# Patient Record
Sex: MALE | Race: WHITE | Employment: OTHER | URBAN - METROPOLITAN AREA
[De-identification: names, ages, dates, MRNs, and addresses within clinical notes are randomized per-mention and may not be internally consistent; named-entity substitution may affect disease eponyms.]

---

## 2020-06-16 ENCOUNTER — TELEPHONE (OUTPATIENT)
Dept: NEUROLOGY | Age: 65
End: 2020-06-16

## 2020-06-16 NOTE — TELEPHONE ENCOUNTER
----- Message from Karen Maldonado sent at 6/16/2020  4:13 PM EDT -----  Regarding: Dr Sera Blanchard first and last name:      Reason for call: Pt is requesting to be scheduled for a new patient appt to be seen for (L) Face pain, in temple and jaw. Pt is being referred by NILS Villanueva at [facility].       Callback required yes/no and why: yes      Best contact number(s):[phone]      Details to clarify the request:      Karen Maldonado

## 2020-06-17 ENCOUNTER — OFFICE VISIT (OUTPATIENT)
Dept: NEUROLOGY | Age: 65
End: 2020-06-17

## 2020-06-17 VITALS
BODY MASS INDEX: 26.17 KG/M2 | OXYGEN SATURATION: 97 % | TEMPERATURE: 97.2 F | HEART RATE: 86 BPM | HEIGHT: 75 IN | RESPIRATION RATE: 18 BRPM | WEIGHT: 210.5 LBS | DIASTOLIC BLOOD PRESSURE: 70 MMHG | SYSTOLIC BLOOD PRESSURE: 122 MMHG

## 2020-06-17 DIAGNOSIS — G50.1 ATYPICAL FACE PAIN: Primary | ICD-10-CM

## 2020-06-17 RX ORDER — ACETAMINOPHEN 500 MG
TABLET ORAL
COMMUNITY

## 2020-06-17 RX ORDER — AA/PROT/LYSINE/METHIO/VIT C/B6 50-12.5 MG
TABLET ORAL
COMMUNITY

## 2020-06-17 RX ORDER — CHOLECALCIFEROL (VITAMIN D3) 50 MCG
CAPSULE ORAL
COMMUNITY

## 2020-06-17 RX ORDER — TESTOSTERONE CYPIONATE 100 MG/ML
INJECTION, SOLUTION INTRAMUSCULAR ONCE
COMMUNITY

## 2020-06-17 RX ORDER — BISMUTH SUBSALICYLATE 262 MG
1 TABLET,CHEWABLE ORAL DAILY
COMMUNITY

## 2020-06-17 RX ORDER — SUMATRIPTAN 100 MG/1
100 TABLET, FILM COATED ORAL
COMMUNITY

## 2020-06-17 RX ORDER — MELATONIN
DAILY
COMMUNITY

## 2020-06-17 RX ORDER — OMEGA-3 FATTY ACIDS 1000 MG
CAPSULE ORAL
COMMUNITY

## 2020-06-17 NOTE — LETTER
6/17/20 Patient: Jairo Marshall YOB: 1955 Date of Visit: 6/17/2020 Marisol Winter, [address] [address] VIA Facsimile: [phone] Dear Marisol Winter DO, Thank you for referring Mr. Melvin Winter to [facility] for evaluation. My notes for this consultation are attached. If you have questions, please do not hesitate to call me. I look forward to following your patient along with you.  
 
 
Sincerely, 
 
Mary Cardona MD

## 2020-06-17 NOTE — PROGRESS NOTES
Atypical left pes neurology Consult      Subjective:      Darnell Sandhu is a 60 y.o. male Who comes in today with a very interesting left facial pain history. He says his story began about 7 or 8 years ago and had a left V2 or maxillary division pain issue. He says it has characteristics of sharp stabbing and continuous discomfort and fairly intense. 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 Had successful coiling with neuro intervention. However on the original issue ENT would do some examination of the sinuses and found out that what sounds like the ostium of his left maxillary sinus would be near closed and they would open it up and on one occasion pain-free for 2 years. Then the symptoms returned and the ostium was near close to get and with reopening he got 4 years of pain relief until about 1-1/2 weeks ago. Recent visit to ENT and they said everything was clear and the ostia was opened. Interestingly he was given Imitrex by the ENT yesterday and last night it worked on his pain? He has no trigger points on his face and is never had any pain attribute in his mouth bit tongue teeth gums cheek etc.  He knows that his aneurysm coil is MRI friendly. He does have a history of shingles but it pertained to his abdominal cavity and not the face. Says hearing smell taste and vision is solid and otherwise uninvolved. Current Outpatient Medications   Medication Sig Dispense Refill    diphenhydramine HCl (BENADRYL PO) Take  by mouth as needed.  testosterone cypionate (DEPO-TESTOSTERONE) 100 mg/mL injection by IntraMUSCular route once.  multivitamin (ONE A DAY) tablet Take 1 Tab by mouth daily.  omega 3-dha-epa-fish oil (Fish Oil) 100-160-1,000 mg cap Take  by mouth.  cholecalciferol (VITAMIN D3) (1000 Units /25 mcg) tablet Take  by mouth daily.       omega-3 fatty acids (Super Omega-3) 1,000 mg cap Take  by mouth.  coenzyme q10 (Co Q-10) 10 mg cap Take  by mouth.  methylsulfonylmethane (MSM PO) Take  by mouth.  acetaminophen (Tylenol Extra Strength) 500 mg tablet Take  by mouth every six (6) hours as needed for Pain.  SUMAtriptan (Imitrex) 100 mg tablet Take 100 mg by mouth once as needed for Migraine.  OTHER Take 100 mcg by mouth. Colostrinin (12 of 36 days)      OTHER Take 2 Tabs by mouth daily. Arthromax      OTHER Take 500 mg by mouth daily. Panothetic Acid   (B-5)      OTHER Take 10 mg by mouth daily. Allertec      OTHER Take 3 Caps by mouth daily. ProstaGenix      OTHER Take 1 Tab by mouth daily. NAD+Cell Rejuvenator      OTHER Take 300 mcg by mouth daily. Lithium      OTHER Take 4 Caps by mouth daily. Vision Essentials      fluticasone (FLONASE) 50 mcg/actuation nasal spray instill 2 sprays into each nostril once daily 16 g 11    VIAGRA 100 mg tablet take 1 tablet by mouth if needed 6 Tab 11    naproxen (NAPROSYN) 250 mg tablet Take  by mouth two (2) times daily (with meals).  OMEPRAZOLE (PRILOSEC PO) Take  by mouth.         No Known Allergies  Past Medical History:   Diagnosis Date    Deviated septum     Headache     Hypogonadism 6/25/2010    Shingles     mid-section    Shoulder dislocation 6/25/2010    Tinnitus       Past Surgical History:   Procedure Laterality Date    HX ACL RECONSTRUCTION      HX COLONOSCOPY      HX HEENT      HX MENISCUS REPAIR      HX REFRACTIVE SURGERY  2000    HX SEPTOPLASTY      VASCULAR SURGERY PROCEDURE UNLIST  2006    Vertebral Artery Coiling      Social History     Socioeconomic History    Marital status:      Spouse name: Not on file    Number of children: Not on file    Years of education: Not on file    Highest education level: Not on file   Occupational History    Not on file   Social Needs    Financial resource strain: Not on file    Food insecurity     Worry: Not on file     Inability: Not on file    Transportation needs     Medical: Not on file     Non-medical: Not on file   Tobacco Use    Smoking status: Never Smoker    Smokeless tobacco: Current User    Tobacco comment: .5 can/day   Substance and Sexual Activity    Alcohol use: Yes     Comment: 1-2 daily    Drug use: No     Comment: 1 can every 2 days    Sexual activity: Yes     Partners: Female     Birth control/protection: None   Lifestyle    Physical activity     Days per week: Not on file     Minutes per session: Not on file    Stress: Not on file   Relationships    Social connections     Talks on phone: Not on file     Gets together: Not on file     Attends Druze service: Not on file     Active member of club or organization: Not on file     Attends meetings of clubs or organizations: Not on file     Relationship status: Not on file    Intimate partner violence     Fear of current or ex partner: Not on file     Emotionally abused: Not on file     Physically abused: Not on file     Forced sexual activity: Not on file   Other Topics Concern    Not on file   Social History Narrative    Not on file      Family History   Problem Relation Age of Onset    Cancer Mother         breast cancer    Cancer Father         lung csncer      Visit Vitals  /70   Pulse 86   Temp 97.2 °F (36.2 °C)   Resp 18   Ht 6' 2.5\" (1.892 m)   Wt 95.5 kg (210 lb 8 oz)   SpO2 97%   BMI 26.67 kg/m²        Review of Systems:   A comprehensive review of systems was negative except for that written in the HPI. Neuro Exam:     Appearance: The patient is well developed, well nourished, provides a coherent history and is in no acute distress. Mental Status: Oriented to time, place and person. Mood and affect appropriate. Cranial Nerves:   Intact visual fields. Fundi are benign. DIANN, EOM's full, no nystagmus, no ptosis. Facial sensation is normal. Corneal reflexes are intact. Facial movement is symmetric. Hearing is normal bilaterally.  Palate is midline with normal sternocleidomastoid and trapezius muscles are normal. Tongue is midline. No synkinetic movements or capture, with left facial muscle movements. Motor:  5/5 strength in upper and lower proximal and distal muscles. Normal bulk and tone. No fasciculations. Reflexes:   Deep tendon reflexes 2+/4 and symmetrical.   Sensory:   Normal to touch, pinprick and vibration. Gait:  Normal gait. Tremor:   No tremor noted. Cerebellar:  No cerebellar signs present. Neurovascular:  Normal heart sounds and regular rhythm, peripheral pulses intact, and no carotid bruits. ENT exam unrevealing. Assessment:   Atypical left face pain history. Definitely not your run-of-the-mill trigeminal neuralgia story. Currently has a normal exam and suppresses pain with simple over-the-counter extra strength Tylenol. I have nothing that matches that performance great. Did talk very quickly about the merits of doing updated MRI imaging of the brain. He will watch his symptom complex and if there is any trending he knows where to find me. If I do not hear from him I will assume he is doing well. We did talk about the careful means of angiographic analysis at [facility] prior to the coiling of the aneurysm, and I am sure they would have made a new discovery of any aberrant blood vessel making contact with his maxillary division of the L trigeminal nerve or anything similar. Plan:   Revisit as needed.   Signed by :  Michela Schulz MD

## 2020-06-17 NOTE — PATIENT INSTRUCTIONS
Patient history reviewed patient examined. A very atypical face pain story to be sure. Patient will watch events as they are and we talked briefly about updated reimaging of the brain with MRI as I understand the aneurysm coil is MRI friendly. Currently managing pain with simple extra strength Tylenol and while I do have other choices I have nothing as simple orders cheap is that remedy. We will leave the revisit open-ended as it is and good luck.

## 2020-07-07 ENCOUNTER — OFFICE VISIT (OUTPATIENT)
Dept: NEUROLOGY | Age: 65
End: 2020-07-07

## 2020-07-07 VITALS
HEART RATE: 94 BPM | BODY MASS INDEX: 27.72 KG/M2 | HEIGHT: 74 IN | DIASTOLIC BLOOD PRESSURE: 64 MMHG | SYSTOLIC BLOOD PRESSURE: 128 MMHG | RESPIRATION RATE: 16 BRPM | OXYGEN SATURATION: 97 % | TEMPERATURE: 96.8 F | WEIGHT: 216 LBS

## 2020-07-07 DIAGNOSIS — G50.1 ATYPICAL FACIAL PAIN: Primary | ICD-10-CM

## 2020-07-07 RX ORDER — CARBAMAZEPINE 100 MG/1
TABLET, CHEWABLE ORAL
Qty: 90 TAB | Refills: 5 | Status: SHIPPED | OUTPATIENT
Start: 2020-07-07

## 2020-07-07 RX ORDER — GABAPENTIN 300 MG/1
CAPSULE ORAL
Qty: 30 CAP | Refills: 0 | Status: SHIPPED | OUTPATIENT
Start: 2020-07-07

## 2020-07-07 NOTE — PATIENT INSTRUCTIONS
Patient history reviewed patient examined. Current plans include getting updated imaging of the head with an MRI and MRA. Patient will search for the [facility] approval of MRI compatibility on his coiled aneurysm. Will dispense the drug Tegretol as needed for pain relief and give him a 30-day prescription of gabapentin 300 mg. This will lead up to potential referral to Dr. Jj Arndt the neurosurgeon and possible consideration of gamma knife surgery.

## 2020-07-07 NOTE — LETTER
7/7/20 Patient: Gary Hendrix YOB: 1955 Date of Visit: 7/7/2020 Ana Hillman, [address] [facility] [id number] VIA Facsimile: [phone] Dear Ana Hillman DO, Thank you for referring Mr. Bere Giles to [facility] for evaluation. My notes for this consultation are attached. If you have questions, please do not hesitate to call me. I look forward to following your patient along with you.  
 
 
Sincerely, 
 
Barrett Camargo MD

## 2020-07-07 NOTE — PROGRESS NOTES
Neurology Consult      Subjective:      Lisa Manrique is a 60 y.o. male who comes in today as a second visit with previously established left V2 atypical facial pain history. Said in the meantime he became symptomatic again and once again describes it as a very intense suicidal type of stabbing pain and it is situated in the left nasolabial fold area and can extend back to the left corner of the eye and left preauricular area. Sometimes feels as if he has enhanced moisture in his mouth. Can occur anytime of the day or night but sometimes his Magic hour is closer in the evening. Today clarified if there was any autonomic symptoms affiliated with the pain as an trigeminal autonomic cephalgia but really not as a goes to perspiration tearing of the eye nose running injection of the eye ptosis among other possibilities. Will prescribe Tegretol 100 mg chewables 3 times a day as needed as a relief and also gabapentin 300 mg 1 a day if needed a month supply. Someone suggested possible referral to Dr. Juan Jose Rider the neurosurgeon and regarding gamma knife. Provided the information from [facility] can be found and endorsed, we will go ahead and order the MRI and MRA of the brain and hope that that can be accomplished sooner rather than later. Revisit in about 6 weeks. Currently has no trigger on the face or in the mouth etc. Someone suggested shingles as a culprit, and he has had abdominal shingles before. I suggest he follow up with primary care and look into the Shingrix vaccine. Current Outpatient Medications   Medication Sig Dispense Refill    carBAMazepine (TEGretol) 100 mg chewable tablet 1 po tid prn  Indications: facial nerve pain 90 Tab 5    gabapentin (NEURONTIN) 300 mg capsule 1 po every day prn  Indications: neuropathic pain 30 Cap 0    diphenhydramine HCl (BENADRYL PO) Take  by mouth as needed.  testosterone cypionate (DEPO-TESTOSTERONE) 100 mg/mL injection by IntraMUSCular route once.       multivitamin (ONE A DAY) tablet Take 1 Tab by mouth daily.  cholecalciferol (VITAMIN D3) (1000 Units /25 mcg) tablet Take  by mouth daily.  coenzyme q10 (Co Q-10) 10 mg cap Take  by mouth.  methylsulfonylmethane (MSM PO) Take  by mouth.  acetaminophen (Tylenol Extra Strength) 500 mg tablet Take  by mouth every six (6) hours as needed for Pain.  OTHER Take 10 mg by mouth daily. Allertec      OTHER Take 3 Caps by mouth daily. ProstaGenix      OTHER Take 1 Tab by mouth daily. NAD+Cell Rejuvenator      OTHER Take 300 mcg by mouth daily. Lithium      fluticasone (FLONASE) 50 mcg/actuation nasal spray instill 2 sprays into each nostril once daily 16 g 11    VIAGRA 100 mg tablet take 1 tablet by mouth if needed 6 Tab 11    naproxen (NAPROSYN) 250 mg tablet Take  by mouth two (2) times daily (with meals).  omega 3-dha-epa-fish oil (Fish Oil) 100-160-1,000 mg cap Take  by mouth.  omega-3 fatty acids (Super Omega-3) 1,000 mg cap Take  by mouth.  SUMAtriptan (Imitrex) 100 mg tablet Take 100 mg by mouth once as needed for Migraine.  OTHER Take 100 mcg by mouth. Colostrinin (12 of 36 days)      OTHER Take 2 Tabs by mouth daily. Arthromax      OTHER Take 500 mg by mouth daily. Panothetic Acid   (B-5)      OTHER Take 4 Caps by mouth daily. Vision Essentials      OMEPRAZOLE (PRILOSEC PO) Take  by mouth.         No Known Allergies  Past Medical History:   Diagnosis Date    Deviated septum     Headache     Hypogonadism 6/25/2010    Shingles     mid-section    Shoulder dislocation 6/25/2010    Tinnitus       Past Surgical History:   Procedure Laterality Date    HX ACL RECONSTRUCTION      HX COLONOSCOPY      HX HEENT      HX MENISCUS REPAIR      HX REFRACTIVE SURGERY  2000    HX SEPTOPLASTY      VASCULAR SURGERY PROCEDURE UNLIST  2006    Vertebral Artery Coiling      Social History     Socioeconomic History    Marital status:      Spouse name: Not on file    Number of children: Not on file    Years of education: Not on file    Highest education level: Not on file   Occupational History    Not on file   Social Needs    Financial resource strain: Not on file    Food insecurity     Worry: Not on file     Inability: Not on file    Transportation needs     Medical: Not on file     Non-medical: Not on file   Tobacco Use    Smoking status: Never Smoker    Smokeless tobacco: Current User    Tobacco comment: .5 can/day   Substance and Sexual Activity    Alcohol use: Yes     Comment: 1-2 daily    Drug use: No     Comment: 1 can every 2 days    Sexual activity: Yes     Partners: Female     Birth control/protection: None   Lifestyle    Physical activity     Days per week: Not on file     Minutes per session: Not on file    Stress: Not on file   Relationships    Social connections     Talks on phone: Not on file     Gets together: Not on file     Attends Holiness service: Not on file     Active member of club or organization: Not on file     Attends meetings of clubs or organizations: Not on file     Relationship status: Not on file    Intimate partner violence     Fear of current or ex partner: Not on file     Emotionally abused: Not on file     Physically abused: Not on file     Forced sexual activity: Not on file   Other Topics Concern    Not on file   Social History Narrative    Not on file      Family History   Problem Relation Age of Onset    Cancer Mother         breast cancer    Cancer Father         lung csncer      Visit Vitals  /64 (BP 1 Location: Left arm, BP Patient Position: Sitting)   Pulse 94   Temp 96.8 °F (36 °C)   Resp 16   Ht 6' 2\" (1.88 m)   Wt 98 kg (216 lb)   SpO2 97%   BMI 27.73 kg/m²        Review of Systems:   A comprehensive review of systems was negative except for that written in the HPI. Neuro Exam:     Appearance: The patient is well developed, well nourished, provides a coherent history and is in no acute distress. Mental Status: Oriented to time, place and person. Mood and affect appropriate. Cranial Nerves:   Intact visual fields. Fundi are benign. DIANN, EOM's full, no nystagmus, no ptosis. Facial sensation is normal. Corneal reflexes are intact. Facial movement is symmetric. Hearing is normal bilaterally. Palate is midline with normal sternocleidomastoid and trapezius muscles are normal. Tongue is midline. Motor:  5/5 strength in upper and lower proximal and distal muscles. Normal bulk and tone. No fasciculations. Reflexes:   Deep tendon reflexes 2+/4 and symmetrical.   Sensory:   Normal to touch, pinprick and vibration. Gait:  Normal gait. Tremor:   No tremor noted. Cerebellar:  No cerebellar signs present. Neurovascular:  Normal heart sounds and regular rhythm, peripheral pulses intact, and no carotid bruits. Assessment:   Atypical left V2 facial pain. See follow-up information and dictation above. Is back in a symptomatic mode and needs help. We will go ahead and get updated imaging of his head that will include brain MRI and MRA. He says he has information from [facility] that declares that his coiled aneurysm is MRI compatible. He is going to go and get that and I will go ahead and make the orders for the testing just reference. Will prescribe Tegretol 100 mg 3 times daily as needed and a 30-day supply of gabapentin 300 mg 1/day as needed. We will follow-up in 6 weeks and may end up making a referral to Dr. Roby Thomas on possible gamma knife intervention. Plan:   Revisit 6 weeks.   Signed by :  Sondra Yee MD

## 2020-07-16 ENCOUNTER — HOSPITAL ENCOUNTER (OUTPATIENT)
Dept: MRI IMAGING | Age: 65
Discharge: HOME | End: 2020-07-16
Attending: SPECIALIST
Payer: COMMERCIAL

## 2020-07-16 DIAGNOSIS — G50.1 ATYPICAL FACIAL PAIN: ICD-10-CM

## 2020-07-16 PROCEDURE — 70551 MRI BRAIN STEM W/O DYE: CPT

## 2020-07-16 PROCEDURE — 70544 MR ANGIOGRAPHY HEAD W/O DYE: CPT

## 2020-07-27 ENCOUNTER — OFFICE VISIT (OUTPATIENT)
Dept: NEUROLOGY | Age: 65
End: 2020-07-27

## 2020-07-27 VITALS
TEMPERATURE: 97.8 F | SYSTOLIC BLOOD PRESSURE: 124 MMHG | WEIGHT: 216 LBS | BODY MASS INDEX: 27.73 KG/M2 | DIASTOLIC BLOOD PRESSURE: 78 MMHG | RESPIRATION RATE: 17 BRPM | HEART RATE: 106 BPM | OXYGEN SATURATION: 96 %

## 2020-07-27 DIAGNOSIS — G50.1 ATYPICAL FACE PAIN: Primary | ICD-10-CM

## 2020-07-27 NOTE — PATIENT INSTRUCTIONS
Patient history reviewed patient examined. Patient doing reasonably well with simple Tegretol 100 mg as needed. I am hoping this is a very self-limited timeframe and that he will be free from the constraints of left facial pain from this point on. Good luck and he certainly welcome to  a free copy of the CD as it goes to both the brain MRI and MRA downstairs.

## 2020-07-27 NOTE — PROGRESS NOTES
Neurology Consult      Subjective:      Noni Lopez is a 60 y.o. male who comes in today with previously described atypical left V2 facial pain. The outpatient brain MRI and MRA were normal with note of coiling of right distal vertebral artery. Went through 3-day periods of separation between left face pain and  Apparently the Tegretol 100 mg chewables worked and sometimes in combination with gabapentin 300 mg. For future reference is certainly welcome to try the Tegretol 200 mg and I have gone higher to achieve nerve pain control with this product as well even 300 mg or higher. Hopefully he will have a prolonged period of pain relief. 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 I do not think this is the basis of his pain as he does not have any confident or florid autonomic phenomena in the course of his pain cycle. He is certainly welcome to  a free CD copy of the brain MRI and MRA down in x-ray and gave him a copy of the radiology report that went with both tests. Even as recent as this face-to-face encounter he does not have a solid trigger on the face or in the mouth and his pain is a slow building discomfort instead of an acute broadcast of neuropathic pain. We will have his appointment go as a see as needed and if I do not hear from him I will have to assume he is doing well. Good luck. He did tell me he got his recent Shingrix vaccination. Current Outpatient Medications   Medication Sig Dispense Refill    carBAMazepine (TEGretol) 100 mg chewable tablet 1 po tid prn  Indications: facial nerve pain 90 Tab 5    gabapentin (NEURONTIN) 300 mg capsule 1 po every day prn  Indications: neuropathic pain 30 Cap 0    diphenhydramine HCl (BENADRYL PO) Take  by mouth as needed.  testosterone cypionate (DEPO-TESTOSTERONE) 100 mg/mL injection by IntraMUSCular route once.       multivitamin (ONE A DAY) tablet Take 1 Tab by mouth daily.  omega 3-dha-epa-fish oil (Fish Oil) 100-160-1,000 mg cap Take  by mouth.  cholecalciferol (VITAMIN D3) (1000 Units /25 mcg) tablet Take  by mouth daily.  omega-3 fatty acids (Super Omega-3) 1,000 mg cap Take  by mouth.  coenzyme q10 (Co Q-10) 10 mg cap Take  by mouth.  methylsulfonylmethane (MSM PO) Take  by mouth.  acetaminophen (Tylenol Extra Strength) 500 mg tablet Take  by mouth every six (6) hours as needed for Pain.  SUMAtriptan (Imitrex) 100 mg tablet Take 100 mg by mouth once as needed for Migraine.  OTHER Take 100 mcg by mouth. Colostrinin (12 of 36 days)      OTHER Take 2 Tabs by mouth daily. Arthromax      OTHER Take 500 mg by mouth daily. Panothetic Acid   (B-5)      OTHER Take 10 mg by mouth daily. Allertec      OTHER Take 3 Caps by mouth daily. ProstaGenix      OTHER Take 1 Tab by mouth daily. NAD+Cell Rejuvenator      OTHER Take 300 mcg by mouth daily. Lithium      OTHER Take 4 Caps by mouth daily. Vision Essentials      fluticasone (FLONASE) 50 mcg/actuation nasal spray instill 2 sprays into each nostril once daily 16 g 11    VIAGRA 100 mg tablet take 1 tablet by mouth if needed 6 Tab 11    naproxen (NAPROSYN) 250 mg tablet Take  by mouth two (2) times daily (with meals).  OMEPRAZOLE (PRILOSEC PO) Take  by mouth.         No Known Allergies  Past Medical History:   Diagnosis Date    Deviated septum     Headache     Hypogonadism 6/25/2010    Shingles     mid-section    Shoulder dislocation 6/25/2010    Tinnitus       Past Surgical History:   Procedure Laterality Date    HX ACL RECONSTRUCTION      HX COLONOSCOPY      HX HEENT      HX MENISCUS REPAIR      HX REFRACTIVE SURGERY  2000    HX SEPTOPLASTY      VASCULAR SURGERY PROCEDURE UNLIST  2006    Vertebral Artery Coiling      Social History     Socioeconomic History    Marital status:      Spouse name: Not on file    Number of children: Not on file    Years of education: Not on file    Highest education level: Not on file   Occupational History    Not on file   Social Needs    Financial resource strain: Not on file    Food insecurity     Worry: Not on file     Inability: Not on file    Transportation needs     Medical: Not on file     Non-medical: Not on file   Tobacco Use    Smoking status: Never Smoker    Smokeless tobacco: Current User    Tobacco comment: .5 can/day   Substance and Sexual Activity    Alcohol use: Yes     Comment: 1-2 daily    Drug use: No     Comment: 1 can every 2 days    Sexual activity: Yes     Partners: Female     Birth control/protection: None   Lifestyle    Physical activity     Days per week: Not on file     Minutes per session: Not on file    Stress: Not on file   Relationships    Social connections     Talks on phone: Not on file     Gets together: Not on file     Attends Christianity service: Not on file     Active member of club or organization: Not on file     Attends meetings of clubs or organizations: Not on file     Relationship status: Not on file    Intimate partner violence     Fear of current or ex partner: Not on file     Emotionally abused: Not on file     Physically abused: Not on file     Forced sexual activity: Not on file   Other Topics Concern    Not on file   Social History Narrative    Not on file      Family History   Problem Relation Age of Onset    Cancer Mother         breast cancer    Cancer Father         lung csncer      Visit Vitals  /78   Pulse (!) 106   Temp 97.8 °F (36.6 °C)   Resp 17   Wt 98 kg (216 lb)   SpO2 96%   BMI 27.73 kg/m²        Review of Systems:   A comprehensive review of systems was negative except for that written in the HPI. Neuro Exam:     Appearance: The patient is well developed, well nourished, provides a coherent history and is in no acute distress. Mental Status: Oriented to time, place and person.  Mood and affect appropriate. Cranial Nerves:   Intact visual fields. Fundi are benign. DIANN, EOM's full, no nystagmus, no ptosis. Facial sensation is normal. Corneal reflexes are intact. Facial movement is symmetric. Hearing is normal bilaterally. Palate is midline with normal sternocleidomastoid and trapezius muscles are normal. Tongue is midline. Motor:  5/5 strength in upper and lower proximal and distal muscles. Normal bulk and tone. No fasciculations. Reflexes:   Deep tendon reflexes 2+/4 and symmetrical.   Sensory:   Normal to touch, pinprick and vibration. Gait:  Normal gait. Tremor:   No tremor noted. Cerebellar:  No cerebellar signs present. Neurovascular:  Normal heart sounds and regular rhythm, peripheral pulses intact, and no carotid bruits. Assessment:   Atypical left V2 facial pain. Will use the Tegretol as needed and hopefully he will have a long period of pain relief from this point onward. Is welcome back here as needed and certainly welcome to  a free CD copy in radiology for the brain MRI and MRA. Plan:   Revisit as needed.   Signed by :  Juan Francisco Guzman MD

## 2020-07-27 NOTE — LETTER
7/27/20 Patient: Kyle Pat YOB: 1955 Date of Visit: 7/27/2020 Kayli Marx, [address] [place] [id number] VIA Facsimile: [phone] Dear Kayli Marx DO, Thank you for referring Mr. Amanda Newsome to [facility] for evaluation. My notes for this consultation are attached. If you have questions, please do not hesitate to call me. I look forward to following your patient along with you.  
 
 
Sincerely, 
 
Jc Maddox MD

## 2021-07-09 ENCOUNTER — HOSPITAL ENCOUNTER (OUTPATIENT)
Dept: LAB | Age: 66
Discharge: HOME | End: 2021-07-09

## 2021-12-07 ENCOUNTER — HOSPITAL ENCOUNTER (OUTPATIENT)
Dept: LAB | Age: 66
Discharge: HOME | End: 2021-12-07

## 2023-05-11 RX ORDER — NAPROXEN 250 MG/1
TABLET ORAL 2 TIMES DAILY WITH MEALS
COMMUNITY

## 2023-05-11 RX ORDER — TESTOSTERONE CYPIONATE 100 MG/ML
INJECTION, SOLUTION INTRAMUSCULAR ONCE
COMMUNITY

## 2023-05-11 RX ORDER — SILDENAFIL 100 MG/1
TABLET, FILM COATED ORAL
COMMUNITY
Start: 2012-02-14

## 2023-05-11 RX ORDER — ACETAMINOPHEN 500 MG
TABLET ORAL EVERY 6 HOURS PRN
COMMUNITY

## 2023-05-11 RX ORDER — FLUTICASONE PROPIONATE 50 MCG
2 SPRAY, SUSPENSION (ML) NASAL DAILY
COMMUNITY
Start: 2015-05-13

## 2023-05-11 RX ORDER — CARBAMAZEPINE 100 MG/1
TABLET, CHEWABLE ORAL
COMMUNITY
Start: 2020-07-07

## 2023-05-11 RX ORDER — SUMATRIPTAN 100 MG/1
TABLET, FILM COATED ORAL
COMMUNITY

## 2023-05-11 RX ORDER — GABAPENTIN 300 MG/1
CAPSULE ORAL
COMMUNITY
Start: 2020-07-07

## 2024-01-23 ENCOUNTER — ANESTHESIA (OUTPATIENT)
Facility: HOSPITAL | Age: 69
End: 2024-01-23
Payer: COMMERCIAL

## 2024-01-23 ENCOUNTER — ANESTHESIA EVENT (OUTPATIENT)
Facility: HOSPITAL | Age: 69
End: 2024-01-23
Payer: COMMERCIAL

## 2024-01-23 ENCOUNTER — HOSPITAL ENCOUNTER (OUTPATIENT)
Facility: HOSPITAL | Age: 69
Setting detail: OUTPATIENT SURGERY
Discharge: HOME | End: 2024-01-23
Attending: SPECIALIST | Admitting: SPECIALIST
Payer: COMMERCIAL

## 2024-01-23 VITALS
OXYGEN SATURATION: 94 % | WEIGHT: 212.3 LBS | TEMPERATURE: 98.1 F | BODY MASS INDEX: 26.4 KG/M2 | RESPIRATION RATE: 17 BRPM | DIASTOLIC BLOOD PRESSURE: 72 MMHG | HEIGHT: 75 IN | HEART RATE: 60 BPM | SYSTOLIC BLOOD PRESSURE: 109 MMHG

## 2024-01-23 PROCEDURE — 7100000010 HC PHASE II RECOVERY - FIRST 15 MIN: Performed by: SPECIALIST

## 2024-01-23 PROCEDURE — 2709999900 HC NON-CHARGEABLE SUPPLY: Performed by: SPECIALIST

## 2024-01-23 PROCEDURE — 2500000003 HC RX 250 WO HCPCS: Performed by: NURSE ANESTHETIST, CERTIFIED REGISTERED

## 2024-01-23 PROCEDURE — 6360000002 HC RX W HCPCS: Performed by: NURSE ANESTHETIST, CERTIFIED REGISTERED

## 2024-01-23 PROCEDURE — 88305 TISSUE EXAM BY PATHOLOGIST: CPT

## 2024-01-23 PROCEDURE — 3600007512: Performed by: SPECIALIST

## 2024-01-23 PROCEDURE — 3600007502: Performed by: SPECIALIST

## 2024-01-23 PROCEDURE — 3700000001 HC ADD 15 MINUTES (ANESTHESIA): Performed by: SPECIALIST

## 2024-01-23 PROCEDURE — 2580000003 HC RX 258: Performed by: SPECIALIST

## 2024-01-23 PROCEDURE — 3700000000 HC ANESTHESIA ATTENDED CARE: Performed by: SPECIALIST

## 2024-01-23 PROCEDURE — 7100000011 HC PHASE II RECOVERY - ADDTL 15 MIN: Performed by: SPECIALIST

## 2024-01-23 RX ORDER — SODIUM CHLORIDE 0.9 % (FLUSH) 0.9 %
5-40 SYRINGE (ML) INJECTION PRN
Status: DISCONTINUED | OUTPATIENT
Start: 2024-01-23 | End: 2024-01-23 | Stop reason: HOSPADM

## 2024-01-23 RX ORDER — SODIUM CHLORIDE 9 MG/ML
INJECTION, SOLUTION INTRAVENOUS CONTINUOUS
Status: DISCONTINUED | OUTPATIENT
Start: 2024-01-23 | End: 2024-01-23 | Stop reason: HOSPADM

## 2024-01-23 RX ORDER — PROPOFOL 10 MG/ML
INJECTION, EMULSION INTRAVENOUS CONTINUOUS PRN
Status: DISCONTINUED | OUTPATIENT
Start: 2024-01-23 | End: 2024-01-23 | Stop reason: SDUPTHER

## 2024-01-23 RX ORDER — DEXMEDETOMIDINE HYDROCHLORIDE 100 UG/ML
INJECTION, SOLUTION INTRAVENOUS PRN
Status: DISCONTINUED | OUTPATIENT
Start: 2024-01-23 | End: 2024-01-23 | Stop reason: SDUPTHER

## 2024-01-23 RX ORDER — LITHIUM CARBONATE 300 MG/1
300 CAPSULE ORAL
COMMUNITY

## 2024-01-23 RX ADMIN — DEXMEDETOMIDINE 4 MCG: 100 INJECTION, SOLUTION INTRAVENOUS at 10:04

## 2024-01-23 RX ADMIN — PROPOFOL 30 MG: 10 INJECTION, EMULSION INTRAVENOUS at 10:07

## 2024-01-23 RX ADMIN — PROPOFOL 140 MCG/KG/MIN: 10 INJECTION, EMULSION INTRAVENOUS at 10:04

## 2024-01-23 RX ADMIN — SODIUM CHLORIDE: 9 INJECTION, SOLUTION INTRAVENOUS at 09:59

## 2024-01-23 RX ADMIN — PROPOFOL 70 MG: 10 INJECTION, EMULSION INTRAVENOUS at 10:06

## 2024-01-23 ASSESSMENT — PAIN - FUNCTIONAL ASSESSMENT: PAIN_FUNCTIONAL_ASSESSMENT: 0-10

## 2024-01-23 NOTE — PROGRESS NOTES
Jd Fisher  1955  [id number]    Situation:  Verbal report received from: Steff ANGULO  Procedure: Procedure(s):  COLONOSCOPY  COLONOSCOPY POLYPECTOMY REMOVAL SNARE/STOMA    Background:    Preoperative diagnosis: Personal history of colonic polyps [Z86.010]  Postoperative diagnosis: * No post-op diagnosis entered *    :  Dr. George  Assistant(s): Circulator: Steff Crowell RN  Endoscopy Technician: Loyd Roth    Specimens:   ID Type Source Tests Collected by Time Destination   1 : polyps Tissue Colon-Ascending SURGICAL PATHOLOGY Nate George MD 1/23/2024 1017    2 : polyp Tissue Sigmoid Colon SURGICAL PATHOLOGY Nate George MD 1/23/2024 1021      H. Pylori  No    Assessment:  Intra-procedure medications    Anesthesia gave intra-procedure sedation and medications, see anesthesia flow sheet Yes    Intravenous fluids: NS@ KVO     Vital signs stable yes    Abdominal assessment: round and soft  yes    Recommendation:  Discharge patient per MD order yes.  Family or Friend wife  Permission to share finding with family or friend Yes

## 2024-01-23 NOTE — PROGRESS NOTES
Endoscopy discharge instructions have been reviewed and given to patient.  The patient verbalized understanding and acceptance of instructions.      Dr. George discussed with spouse procedure findings and next steps.

## 2024-01-23 NOTE — ANESTHESIA PRE PROCEDURE
Department of Anesthesiology  Preprocedure Note       Name:  Jd Fisher   Age:  69 y.o.  :  1955                                          MRN:  [id number]         Date:  2024      Surgeon: Surgeon(s):  Nate George MD    Procedure: Procedure(s):  COLONOSCOPY    Medications prior to admission:   Prior to Admission medications    Medication Sig Start Date End Date Taking? Authorizing Provider   lithium 300 MG capsule Take 1 capsule by mouth 3 times daily (with meals)   Yes Provider, MD Aurelia   acetaminophen (TYLENOL) 500 MG tablet Take by mouth every 6 hours as needed    Automatic Reconciliation, Ar   carBAMazepine (TEGRETOL) 100 MG chewable tablet 1 po tid prn  Indications: facial nerve pain 20   Automatic Reconciliation, Ar   vitamin D (CHOLECALCIFEROL) 25 MCG (1000 UT) TABS tablet Take by mouth daily    Automatic Reconciliation, Ar   Coenzyme Q10 10 MG CAPS Take by mouth    Automatic Reconciliation, Ar   fluticasone (FLONASE) 50 MCG/ACT nasal spray 2 sprays by Each Nostril route daily 5/13/15   Automatic Reconciliation, Ar   gabapentin (NEURONTIN) 300 MG capsule 1 po every day prn  Indications: neuropathic pain 20   Automatic Reconciliation, Ar   naproxen (NAPROSYN) 250 MG tablet Take by mouth 2 times daily (with meals)    Automatic Reconciliation, Ar   sildenafil (VIAGRA) 100 MG tablet take 1 tablet by mouth if needed 12   Automatic Reconciliation, Ar   SUMAtriptan (IMITREX) 100 MG tablet Take by mouth once as needed    Automatic Reconciliation, Ar   testosterone cypionate (DEPOTESTOTERONE CYPIONATE) 100 MG/ML injection Inject into the muscle once.  Patient not taking: Reported on 2024    Automatic Reconciliation, Ar       Current medications:    Current Facility-Administered Medications   Medication Dose Route Frequency Provider Last Rate Last Admin   • 0.9 % sodium chloride infusion   IntraVENous Continuous Nate George MD       • sodium chloride flush

## 2024-01-23 NOTE — H&P
69 y.o. male for open access colonoscopy for screening   Additional data for completion of the targeted pre-endoscopy H&P will be provided under 'H&P interval notes'.  Please see that document which will be attached to this.  Nate George MD    Last colonoscopy [place] 2012 Tubular Adenoma

## 2024-01-23 NOTE — H&P
Pre-Endoscopy H&P Update  Chief complaint/HPI/ROS:  The indication for the procedure, the patient's history and the patient's current medications are reviewed prior to the procedure and that data is reported on the H&P to which this document is attached.  Any significant complaints with regard to organ systems will be noted, and if not mentioned then a review of systems is not contributory.  Past Medical History:   Diagnosis Date    Deviated septum     Headache     Shingles     mid-section    Shoulder dislocation 2010    Tinnitus       Past Surgical History:   Procedure Laterality Date    ANTERIOR CRUCIATE LIGAMENT REPAIR      COLONOSCOPY      [place]     HEENT      KNEE SURGERY      REFRACTIVE SURGERY      SEPTOPLASTY      VASCULAR SURGERY  2006    Vertebral Artery Coiling     Social   Social History     Tobacco Use    Smoking status: Never    Smokeless tobacco: Current    Tobacco comments:     Quit smoking: .5 can/day   Substance Use Topics    Alcohol use: Yes     Alcohol/week: 1.0 standard drink of alcohol     Types: 1 Glasses of wine per week     Comment: daily      Family History   Problem Relation Age of Onset    Cancer Father         lung csncer    Cancer Mother         breast cancer      No Known Allergies   Prior to Admission Medications   Prescriptions Last Dose Informant Patient Reported? Taking?   Coenzyme Q10 10 MG CAPS 2024  Yes No   Sig: Take by mouth   SUMAtriptan (IMITREX) 100 MG tablet   Yes No   Sig: Take by mouth once as needed   acetaminophen (TYLENOL) 500 MG tablet   Yes No   Sig: Take by mouth every 6 hours as needed   carBAMazepine (TEGRETOL) 100 MG chewable tablet   Yes No   Si po tid prn  Indications: facial nerve pain   fluticasone (FLONASE) 50 MCG/ACT nasal spray 2024  Yes No   Si sprays by Each Nostril route daily   gabapentin (NEURONTIN) 300 MG capsule   Yes No   Si po every day prn  Indications: neuropathic pain   lithium 300 MG capsule Past Week  Yes

## 2024-01-23 NOTE — DISCHARGE INSTRUCTIONS
[facility]  [facility]  Nate Ibrahim MD  [phone]      January 23, 2024    Jd Fisher  YOB: 1955    COLONOSCOPY DISCHARGE INSTRUCTIONS    If there is redness at IV site you should apply warm compress to area.  If redness or soreness persist contact Dr. Ibrahim' or your primary care doctor.    There may be a slight amount of blood passed from the rectum.  Gaseous discomfort may develop, but walking, belching will help relieve this.  You may not operate a vehicle for 12 hours  You may not operate machinery or dangerous appliances for rest of today  You may not drink alcoholic beverages for 12 hours  Avoid making any critical decisions for 24 hours    DIET:  You may resume your normal diet, but some patients find that heavy or large meals may lead to indigestion or vomiting.  I suggest a light meal as first food intake.    MEDICATIONS:  The use of some over-the-counter pain medication may lead to bleeding after colon biopsies or polyp removal.  Tylenol (also called acetaminophen) is safe to take even if you have had colonoscopy with polyp removal.  Based on the procedure you had today you may not safely take aspirin or aspirin-like products for the next ten (10) days.  Remember that Tylenol (also called acetaminophen) is safe to take after colonoscopy even if you have had biopsies or polyps removed.    ACTIVITY:  You may resume your normal household activities, but it is recommended that you spend the remainder of the day resting -  avoid any strenuous activity.    CALL DR. IBRAHIM' OFFICE IF:  Increasing pain, nausea, vomiting  Abdominal distension (swelling)  Significant new or increased bleeding (oral or rectal)  Fever/Chills  Chest pain/shortness of breath                       Additional instructions:   Great news, no colon cancer but we did find and remove three polyps and I'll contact you with those results in

## 2024-01-23 NOTE — OP NOTE
[facility]  [facility]  Nate George MD  [phone]      2024    Colonoscopy Procedure Note  Jd Fisher  :  1955  [place] Medical Record Number: [id number]    Indications:     Personal history of colon polyps (screening only)  PCP:  Jm Carrizales DO  Anesthesia/Sedation: Conscious Sedation/Moderate Sedation/GETA, see notes  Endoscopist:  Dr. Nate George  Complications:  None  Estimated Blood Loss:  None    Permit:  The indications, risks, benefits and alternatives were reviewed with the patient or their decision maker who was provided an opportunity to ask questions and all questions were answered.  The specific risks of colonoscopy with conscious sedation were reviewed, including but not limited to anesthetic complication, bleeding, adverse drug reaction, missed lesion, infection, IV site reactions, and intestinal perforation which would lead to the need for surgical repair.  Alternatives to colonoscopy including radiographic imaging, observation without testing, or laboratory testing were reviewed including the limitations of those alternatives.  After considering the options and having all their questions answered, the patient or their decision maker provided both verbal and written consent to proceed.        Procedure in Detail:  After obtaining informed consent, positioning of the patient in the left lateral decubitus position, and conduction of a pre-procedure pause or \"time out\" the endoscope was introduced into the anus and advanced to the cecum, which was identified by the ileocecal valve and appendiceal orifice.  The quality of the colonic preparation was good.  A careful inspection was made as the colonoscope was withdrawn, findings and interventions are described below.    Findings:   Ascending polyps 4mm and 6mm; sigmoid polyp 3mm.  All removed with cold snare, retrieved, and

## 2024-01-23 NOTE — PERIOP NOTE
Endoscope was pre-cleaned at bedside immediately following procedure by Michael. Assumed pt care taken to recovery via stretcher for further monitoring please see CRNA chart sedation/monitoring for procedures pt tolerated well.

## (undated) DEVICE — SNARE ENDOSCP POLYP MED STD AD 2.4X27X240 CM 2.8 MM OVL SENS

## (undated) DEVICE — CONTAINER SPEC 20 ML LID NEUT BUFF FORMALIN 10 % POLYPR STS

## (undated) DEVICE — SYRINGE INFL 60ML DISP ALLIANCE II